# Patient Record
Sex: FEMALE | Race: WHITE | NOT HISPANIC OR LATINO | URBAN - METROPOLITAN AREA
[De-identification: names, ages, dates, MRNs, and addresses within clinical notes are randomized per-mention and may not be internally consistent; named-entity substitution may affect disease eponyms.]

---

## 2024-04-12 ENCOUNTER — HOSPITAL ENCOUNTER (EMERGENCY)
Facility: OTHER | Age: 24
Discharge: HOME | End: 2024-04-12
Attending: EMERGENCY MEDICINE
Payer: COMMERCIAL

## 2024-04-12 VITALS
RESPIRATION RATE: 19 BRPM | TEMPERATURE: 100 F | HEART RATE: 98 BPM | OXYGEN SATURATION: 100 % | HEIGHT: 62 IN | DIASTOLIC BLOOD PRESSURE: 76 MMHG | BODY MASS INDEX: 23.92 KG/M2 | SYSTOLIC BLOOD PRESSURE: 122 MMHG | WEIGHT: 130 LBS

## 2024-04-12 DIAGNOSIS — J18.9 ATYPICAL PNEUMONIA: Primary | ICD-10-CM

## 2024-04-12 LAB
B-HCG UR QL: NEGATIVE
CTP QC/QA: YES
GROUP A STREP, MOLECULAR: NEGATIVE
POC MOLECULAR INFLUENZA A AGN: NEGATIVE
POC MOLECULAR INFLUENZA B AGN: NEGATIVE
SARS-COV-2 RDRP RESP QL NAA+PROBE: NEGATIVE

## 2024-04-12 PROCEDURE — 25000003 PHARM REV CODE 250

## 2024-04-12 PROCEDURE — 96372 THER/PROPH/DIAG INJ SC/IM: CPT

## 2024-04-12 PROCEDURE — 81025 URINE PREGNANCY TEST: CPT | Performed by: PHYSICIAN ASSISTANT

## 2024-04-12 PROCEDURE — 87635 SARS-COV-2 COVID-19 AMP PRB: CPT

## 2024-04-12 PROCEDURE — 87651 STREP A DNA AMP PROBE: CPT | Performed by: PHYSICIAN ASSISTANT

## 2024-04-12 PROCEDURE — 63600175 PHARM REV CODE 636 W HCPCS

## 2024-04-12 PROCEDURE — 99284 EMERGENCY DEPT VISIT MOD MDM: CPT | Mod: 25

## 2024-04-12 RX ORDER — IBUPROFEN 800 MG/1
800 TABLET ORAL EVERY 6 HOURS PRN
Qty: 20 TABLET | Refills: 0 | Status: SHIPPED | OUTPATIENT
Start: 2024-04-12

## 2024-04-12 RX ORDER — GUAIFENESIN AND DEXTROMETHORPHAN HYDROBROMIDE 10; 100 MG/5ML; MG/5ML
10 SYRUP ORAL EVERY 4 HOURS PRN
Qty: 120 ML | Refills: 0 | Status: SHIPPED | OUTPATIENT
Start: 2024-04-12 | End: 2024-04-22

## 2024-04-12 RX ORDER — LIDOCAINE HYDROCHLORIDE 20 MG/ML
5 SOLUTION OROPHARYNGEAL
Status: COMPLETED | OUTPATIENT
Start: 2024-04-12 | End: 2024-04-12

## 2024-04-12 RX ORDER — LIDOCAINE HYDROCHLORIDE 20 MG/ML
SOLUTION OROPHARYNGEAL EVERY 4 HOURS PRN
Qty: 100 ML | Refills: 0 | Status: SHIPPED | OUTPATIENT
Start: 2024-04-12 | End: 2024-04-17

## 2024-04-12 RX ORDER — GUAIFENESIN AND DEXTROMETHORPHAN HYDROBROMIDE 10; 100 MG/5ML; MG/5ML
10 SYRUP ORAL
Status: COMPLETED | OUTPATIENT
Start: 2024-04-12 | End: 2024-04-12

## 2024-04-12 RX ORDER — KETOROLAC TROMETHAMINE 30 MG/ML
15 INJECTION, SOLUTION INTRAMUSCULAR; INTRAVENOUS
Status: COMPLETED | OUTPATIENT
Start: 2024-04-12 | End: 2024-04-12

## 2024-04-12 RX ORDER — AZITHROMYCIN 250 MG/1
TABLET, FILM COATED ORAL
Qty: 6 TABLET | Refills: 0 | Status: SHIPPED | OUTPATIENT
Start: 2024-04-12 | End: 2024-04-17

## 2024-04-12 RX ADMIN — LIDOCAINE HYDROCHLORIDE 5 ML: 20 SOLUTION ORAL at 07:04

## 2024-04-12 RX ADMIN — KETOROLAC TROMETHAMINE 15 MG: 30 INJECTION, SOLUTION INTRAMUSCULAR; INTRAVENOUS at 07:04

## 2024-04-12 RX ADMIN — GUAIFENESIN AND DEXTROMETHORPHAN 10 ML: 100; 10 SYRUP ORAL at 07:04

## 2024-04-12 NOTE — FIRST PROVIDER EVALUATION
Emergency Department TeleTriage Encounter Note      CHIEF COMPLAINT    Chief Complaint   Patient presents with    Cough     Cough and sore throat, fever and chills x1 week. No relief from rx meds for URI. Ibuprofen last taken at 1230 today.        VITAL SIGNS   Initial Vitals [04/12/24 1802]   BP Pulse Resp Temp SpO2   122/76 98 19 99.8 °F (37.7 °C) 100 %      MAP       --            ALLERGIES    Review of patient's allergies indicates:  No Known Allergies    PROVIDER TRIAGE NOTE  Patient presents with 1 week history of cough, sore throat, fever. She was seen at urgent care in [place] and tested negative for covid, flu and strep. Symptoms have been gradually worsening. Reports chest pain with breathing and cough.       ORDERS  Labs Reviewed - No data to display    ED Orders (720h ago, onward)      None              Virtual Visit Note: The provider triage portion of this emergency department evaluation and documentation was performed via [employer], a HIPAA-compliant telemedicine application, in concert with a tele-presenter in the room. A face to face patient evaluation with one of my colleagues will occur once the patient is placed in an emergency department room.      DISCLAIMER: This note was prepared with [employer] voice recognition transcription software. Garbled syntax, mangled pronouns, and other bizarre constructions may be attributed to that software system.

## 2024-04-12 NOTE — ED TRIAGE NOTES
Seen at  on Monday with c/o sinus congestion and fever. States no improvement with prescribed medications. Presents awake, alert with c/o continued sinus congestion, cough, fever, and body aches. Taking OTC meds with temporary relief. Presents in no distress.

## 2024-04-12 NOTE — ED PROVIDER NOTES
Encounter Date: 4/12/2024       History     Chief Complaint   Patient presents with    Cough     Cough and sore throat, fever and chills x1 week. No relief from rx meds for URI. Ibuprofen last taken at 1230 today.      Hermilo Danielson is a 24 y.o. female with history of Hashimoto's disease compliant with levothyroxine. presenting to the emergency department for evaluation of fatigue, fever, chills, sore throat, productive cough, body aches and shortness of breath, and chest pain from repetitive coughing that began last weekend. Patient states that she is visiting [place] from [place] for a conference. Reports that she was seen at Urgent Care in [place] 4 nights ago and was diagnosed with a URI. She reports that she had negative COVID, flu and strep tests at Urgent Care. She was discharged with scripts for albuterol inhaler, Tessalon Perles and was advised to take ibuprofen for fever and pain. She does not feel like she is getting any better. No other complaints at this time.       The history is provided by the patient.     Review of patient's allergies indicates:  No Known Allergies  No past medical history on file.  No past surgical history on file.  No family history on file.     Review of Systems   Constitutional:  Positive for chills, fatigue and fever.   HENT:  Positive for sore throat. Negative for congestion and rhinorrhea.    Respiratory:  Positive for cough and shortness of breath.    Cardiovascular:  Positive for chest pain.   Gastrointestinal:  Negative for abdominal pain, diarrhea, nausea and vomiting.   Genitourinary:  Negative for dysuria, frequency and urgency.   Musculoskeletal:  Negative for back pain.   Skin:  Negative for rash.   Neurological:  Negative for dizziness and headaches.   Psychiatric/Behavioral:  Negative for confusion.        Physical Exam     Initial Vitals [04/12/24 1802]   BP Pulse Resp Temp SpO2   122/76 98 19 99.8 °F (37.7 °C) 100 %      MAP       --         Physical  Exam    Nursing note and vitals reviewed.  Constitutional: She appears well-developed and well-nourished. No distress.   Hoarse voice.    HENT:   Head: Normocephalic and atraumatic.   Nose: Nose normal.   Mouth/Throat: Oropharynx is clear and moist.   Eyes: Conjunctivae and EOM are normal.   Neck: Neck supple.   Normal range of motion.  Cardiovascular:  Normal rate, regular rhythm, normal heart sounds and intact distal pulses.           Pulmonary/Chest: Breath sounds normal. No respiratory distress. She has no wheezes. She has no rhonchi. She has no rales. She exhibits no tenderness.   Musculoskeletal:         General: Normal range of motion.      Cervical back: Normal range of motion and neck supple.     Neurological: She is alert and oriented to person, place, and time. She has normal strength.   Skin: Skin is warm and dry.   Psychiatric: She has a normal mood and affect. Her behavior is normal. Judgment and thought content normal.         ED Course   Procedures  Labs Reviewed   GROUP A STREP, MOLECULAR   POCT URINE PREGNANCY   POCT INFLUENZA A/B MOLECULAR   SARS-COV-2 RDRP GENE          Imaging Results              X-Ray Chest AP Portable (Final result)  Result time 04/12/24 19:45:01      Final result by Lupe Ba MD (04/12/24 19:45:01)                   Impression:      As above described.      Electronically signed by: Lupe Ba  Date:    04/12/2024  Time:    19:45               Narrative:    EXAMINATION:  AP PORTABLE CHEST    CLINICAL HISTORY:  fever, cough;    TECHNIQUE:  AP portable chest radiograph was submitted.    COMPARISON:  None.    FINDINGS:  AP portable chest radiograph demonstrates a cardiac silhouette within normal limits.  There is patchy subtle interstitial and parenchymal attenuation.  Findings may represent infiltrates or edema.  There is no pneumothorax or significant pleural effusion.                                       Medications   ketorolac injection 15 mg (15 mg  Intramuscular Given 4/12/24 1902)   dextromethorphan-guaiFENesin  mg/5 ml liquid 10 mL (10 mLs Oral Given 4/12/24 1903)   LIDOcaine viscous HCl 2% oral solution 5 mL (5 mLs Oral Given 4/12/24 1902)     Medical Decision Making  Amount and/or Complexity of Data Reviewed  Labs: ordered.    Risk  OTC drugs.  Prescription drug management.                          Medical Decision Making:   Initial Assessment:   Urgent evaluation of 24 year old female with history of Hashimoto's disease on levothyroxine presenting with fever, chills, worsening of productive cough, chest pain with coughing for approximately one week. Visiting from [place]. Was seen at urgent care in [place] 4 nights ago and diagnosed with URI. On exam, she is ill appearing but non toxic. Hemodynamically stable. Afebrile in the ED. Hoarse voice. Erythema to posterior oropharynx. No tonsillar exudates or edema. Lungs are clear to auscultation. CXR and Group A strep test ordered by the triage provider. Will give Toradol, antitussive and viscous lidocaine.   Differential Diagnosis:   Viral URI, viral syndrome, COVID, influenza, group a strep, pneumonia, bronchitis  Clinical Tests:   Lab Tests: Ordered and Reviewed  Radiological Study: Ordered and Reviewed  ED Management:  On review of labs, rapid COVID, influenza tests are negative.  Group a strep test is also negative.  On chest x-ray, there is patchy subtle interstitial and parenchymal attenuation.  Findings may represent infiltrates or edema concerning for early pneumonia. I updated the patient on all results.  Although symptoms are less than 10 days, patient is visiting for a conference for a few more days and it seems like her symptoms are getting worse.  Discharged with prescriptions for Z-Nghia, antitussive, ibuprofen, and viscous lidocaine.  Also recommended alternating Tylenol with ibuprofen as needed for any fever, headache, or pain.  Advised her to stay hydrated with fluids containing electrolytes  and to keep a bland diet.  Patient verbalized understanding and agreement with this plan of care. She was given specific return precautions. Advised to follow up with PCP as needed. All questions and concerns addressed. She is stable for discharge.     This note was created with [employer] Fluency Direct Dictation. Please excuse any spelling or grammatical errors.             Clinical Impression:  Final diagnoses:  [J18.9] Atypical pneumonia (Primary)          ED Disposition Condition    Discharge Stable          ED Prescriptions       Medication Sig Dispense Start Date End Date Auth. Provider    azithromycin (Z-NADIR) 250 MG tablet Take 2 tablets by mouth on day 1; Take 1 tablet by mouth on days 2-5 6 tablet 4/12/2024 4/17/2024 Mason Desai PA-C    dextromethorphan-guaiFENesin  mg/5 ml (ROBITUSSIN-DM)  mg/5 mL liquid Take 10 mLs by mouth every 4 (four) hours as needed (cough). 120 mL 4/12/2024 4/22/2024 Mason Desai PA-C    ibuprofen (ADVIL,MOTRIN) 800 MG tablet Take 1 tablet (800 mg total) by mouth every 6 (six) hours as needed for Pain (for fever, headache). 20 tablet 4/12/2024 -- Mason Desai PA-C    LIDOcaine viscous HCl 2% (LIDOCAINE VISCOUS) 2 % Soln by Mucous Membrane route every 4 (four) hours as needed (for sore throat). 100 mL 4/12/2024 4/17/2024 Mason Desai PA-C          Follow-up Information    None          Mason Desai PA-C  04/13/24 1813       Mason Desai PA-C  04/13/24 1814

## 2024-04-13 NOTE — DISCHARGE INSTRUCTIONS
Alternate Tylenol 650 mg and ibuprofen 800 mg every 6 hours as needed for fever, headache, body aches, chest pain, or sore throat. Take cough syrup as needed for cough. Use viscous lidocaine as needed for sore throat. Start and complete Z michelle. Make sure you are staying hydrated with fluids containing electrolytes. Follow up with PCP or present to the ER in [place] if symptoms persist or worsen.